# Patient Record
(demographics unavailable — no encounter records)

---

## 2024-12-03 NOTE — REVIEW OF SYSTEMS
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

## 2024-12-03 NOTE — PHYSICAL EXAM
[FreeTextEntry1] : videoPHYSICAL EXAMINATION: General appearance - well appearing and smiles Mental status - nonverbal Commands:not following commands Respiratory - no wheezing heard CHEST: equal expansion upon breathing in Abdomen - was not checked Skin - no rash Neurological -  Musculoskeletal - Range of Motion: Right UE: elbow can be extended to neutral Left UE: [elbow can be extended to neutral ] Right LE: popliteal angle 35 degrees and hip abduction angle 45 degree---->70 degreees Left LE: same as above.
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
## 2024-12-10 NOTE — CONSULT LETTER
[Dear  ___] : Dear  [unfilled], [Courtesy Letter:] : I had the pleasure of seeing your patient, [unfilled], in my office today. [Please see my note below.] : Please see my note below. [Sincerely,] : Sincerely, [FreeTextEntry3] : Eden Alberto CPNP Certified Pediatric Nurse Practitioner  Pediatric Neurology  [facility]

## 2024-12-10 NOTE — PHYSICAL EXAM
[Well-appearing] : well-appearing [Normocephalic] : normocephalic [No facial asymmetry or weakness] : no facial asymmetry or weakness [No abnormal involuntary movements] : no abnormal involuntary movements [de-identified] : asleep  [de-identified] : does not follow directions, does not respond to name, no purposeful words noted  [de-identified] : hypertonia, contractures  [de-identified] : non-ambulatory

## 2024-12-10 NOTE — ASSESSMENT
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 improvement in seizures since increasing VImpat. .Spasticity improved since Botox.

## 2024-12-10 NOTE — PLAN
[FreeTextEntry1] : - Change Phenobarbital to tablet per patient preference  - Continue Onfi 5ml BID  - Continue Baclofen 10mg BID - Continue Vimpat 6ml BID ( 6mg/kg/day) - Continue all services  - Recommend establishing care with Neurosurgery for  shunt  - Follow up 3 months - call sooner for concerns

## 2024-12-19 NOTE — ASSESSMENT
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 improvement in seizures since increasing VImpat but recent concerns for daytime sleepiness. .Spasticity improved since Botox.
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
## 2024-12-19 NOTE — PHYSICAL EXAM
[de-identified] : does not make eye contact  [de-identified] : does not follow directions, does not respond to name, no purposeful words noted  [de-identified] : hypertonia, contractures  [de-identified] : non-ambulatory  [de-identified] : + clonus

## 2024-12-19 NOTE — PLAN
[FreeTextEntry1] : - Continue Phenobarbitol 32.4 mg BID  - Change  Onfi 2.5ml/ 7.5ml to see if improves daytime sleepiness  - Continue Baclofen 10mg BID - Continue Vimpat 6ml BID ( 6mg/kg/day) - Continue all services  - Refer to GI for poor weight gain  - Follow up 3 months - call sooner for concerns

## 2024-12-19 NOTE — PHYSICAL EXAM
[de-identified] : does not make eye contact  [de-identified] : does not follow directions, does not respond to name, no purposeful words noted  [de-identified] : hypertonia, contractures  [de-identified] : non-ambulatory  [de-identified] : + clonus

## 2024-12-19 NOTE — PHYSICAL EXAM
[de-identified] : does not make eye contact  [de-identified] : does not follow directions, does not respond to name, no purposeful words noted  [de-identified] : hypertonia, contractures  [de-identified] : non-ambulatory  [de-identified] : + clonus

## 2024-12-19 NOTE — CONSULT LETTER
[FreeTextEntry3] : Eden Alberto CPNP Certified Pediatric Nurse Practitioner  Pediatric Neurology  [facility]

## 2024-12-19 NOTE — CONSULT LETTER
[FreeTextEntry3] : Eden Alberto CPNP Certified Pediatric Nurse Practitioner  Pediatric Neurology  [facility]

## 2024-12-19 NOTE — CONSULT LETTER
[FreeTextEntry3] : Eden Alberto CPNP Certified Pediatric Nurse Practitioner  Pediatric Neurology  [facility]

## 2025-01-09 NOTE — ASSESSMENT
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 Improvement in seizures since increasing VImpat but recent concerns for daytime sleepiness as well as decrease po and coughing with feeds.. .Spasticity improved since Botox.

## 2025-01-09 NOTE — PLAN
[FreeTextEntry1] : - Continue Phenobarbitol 32.4 mg BID  - Change  Onfi 2.5ml/ 7.5ml to see if improves daytime sleepiness  - Continue Baclofen 10mg BID - Continue Vimpat 6ml BID ( 6mg/kg/day) - Continue all services  - Refer to GI for poor weight gain  - Trough levels  - Follow up 3 months - call sooner for concerns

## 2025-01-09 NOTE — REASON FOR VISIT
[Follow-Up Evaluation] : a follow-up evaluation for [Seizure Disorder] : seizure disorder [Home] : at home, [unfilled] , at the time of the visit. [Medical Office: ([facility])___] : at the medical office located in  [Mother] : mother [FreeTextEntry2] : Mother

## 2025-01-09 NOTE — CONSULT LETTER
[Dear  ___] : Dear  [unfilled], [Courtesy Letter:] : I had the pleasure of seeing your patient, [unfilled], in my office today. [Please see my note below.] : Please see my note below. [Sincerely,] : Sincerely, [FreeTextEntry3] : Eden Alberto CPNP Certified Pediatric Nurse Practitioner  Pediatric Neurology  [facility]
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
## 2025-01-09 NOTE — PHYSICAL EXAM
[Well-appearing] : well-appearing [Normocephalic] : normocephalic [No abnormal involuntary movements] : no abnormal involuntary movements [de-identified] : does not make eye contact  [de-identified] : does not follow directions, does not respond to name, no purposeful words noted  [de-identified] : hypertonia, contractures  [de-identified] : non-ambulatory  [de-identified] : + clonus

## 2025-02-05 NOTE — ASSESSMENT
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 Patient was accompanied to the evaluation by their mother who provided the case history information, and served as a reliable informant. Parent reported coughing consistently with Thin Liquids and minimal coughing with purees.    PRIMARY LANGUAGE:  Reported Primary Language:English.    BIRTH & MEDICAL HISTORY: Birth and medical history was gathered via parent interview and review of electronic medical record. Patient was born full term via vaginal delivery. There were no complications during delivery, and he was discharged home with Mother. He hit all early milestones appropriately up until 4 months of age when patient suffered non-accidental trauma. Patient was discharged to rehab with diagnosis of Cerebral palsy and epilepsy s/p  shut. As per mother they recently moved from [place] where he was seeing a neurologist for his seizures. His most recent EEG was 1 yr. ago in [place] which mother reports was abnormal. His seizures are described as generalized, with a right-side eye gaze followed by bilateral hand stiffening and trembling. Reported pneumonia diagnosis with rhinovirus in July 2024.     Current Respiratory Status: Room air  Medications: Medication use was reviewed and a list of patient's current medications is available in their chart.  Medical allergies: No known Medical allergies reported   Food allergies: No known food allergies reported   Food intolerances: No known food intolerances reported   THERAPEUTIC HISTORY: Per report, patients receiving therapist through school based services with speech therapy (2x/week), feeding therapy (3x/weeK), occupational (2x/week), vision therapy (2x/week) and physical therapy (3x/week).    Results of Previous Modified Barium Swallow Study (MBSS)/Videofluoroscopic Swallow Studies (VFSS):  Parent reported when patient was two years of age, patient had a Modified Barium Swallow Study completed in [place]. Reported no penetration/aspiration viewed for all solids and Thin Liquids.    Results of Previous Clinical swallow evaluations:  None reported    FEEDING HISTORY:  Current Diet (based on the International Dysphagia Diet Standardization initiative [IDDSI]):  Oral diet of purees (IDDSI Level 4) and Thin Liquids (IDDSI Level 0). Parent reported decreased PO intake. Patient eats purees and soft & bite sized solids at home and purees at school as school speech language pathologist has concerns for aspiration. Parent reported consistent coughing with Thin Liquids. Minimal coughing with purees and some coughing with soft & bite sized solids. Denied any recent URIs. Reported pneumonia diagnosis with rhinovirus in July 2024.     Feeding Method: Some assistance required   Reported Endurance During Meals: Fair   Feeding schedule: Meals 3x/day with snacks   Feeding tube history: none reported   Mental status: alert and responsive  Head Control: Utilization of specialized equipment (personal wheelchair). Patient required head support from clinician during feeding task.   Trunk Control: Utilization of specialized equipment (personal wheelchair)   ORAL MOTOR ASSESSMENT:  A cursory oral mechanism examination was limited due to reduced participation and reduced ability to follow commands.  Patient presented with open mouth posture.   Dentition:  Within functional limits for age   Oral Mucosa: Moist  Labial: Impaired strength and impaired coordination   Lingual: Impaired strength and impaired coordination   Secretion management: some drooling observed   MODIFIED BARIUM SWALLOW STUDY (MBSS)/VIDEOFLOUROSCOPIC SWALLOW STUDY (VFSS)ASSESSMENT:  The patient was assessed seated upright in tumble form chair [facility] with [facility]iologist present. Patient's caregiver was present throughout. Patient was fed by clinician.    Respiratory Status during Evaluation:Room air  Secretion Management: WFL   There was, no coughing, no throat clearing, no wet/gurgly vocal quality prior to PO administration.  The patient was alert and cooperative.   VFSS/MBS Eval: Solid Trials:  Consistencies Administered:   Pudding thick consistency via one fourth of a teaspoon  Purees (IDDSI Level 4) via one fourth of a teaspoon    Modality: Dr. Devi's infant spoon  Feeding method: Fed by clinician  Positioning: seated upright in tumble form chair  Endurance during trials: fair  Patient required minimal encouragement/praise to complete testing/evaluation.  There was no coughing, no throat clearing, no wet/gurgly vocal quality prior to PO administration.   Oral Stages for Solids:   Patient's oral preparatory stage marked by weak and severe discoordination of lingual and labial movements, impacting age appropriate feeding skills with reduced labial seal, inadequate stripping of spoon. Patient's oral stage characterized by severe discoordination of oral skills and severely reduced oral control, resulting in moderate premature spillage to the pyriforms and valleculae. Decreased bolus formation, cohesion and manipulation noted with delayed anterior to posterior transport for purees and pudding thick consistency.    Pharyngeal Phase for Solids:   Pharyngeal stage was marked by   Moderately delayed initiation of the pharyngeal swallow  Moderately reduced hyolaryngeal elevation  Mildly reduced epiglottic retroflexion  Delayed pharyngeal transit time   Laryngeal penetration  - Inconsistent mild silent penetration viewed during the swallow with immediate and spontaneous retrieval viewed for Purees (IDDSI Level 4)  - Inconsistent moderate silent penetration viewed during the swallow with immediate and spontaneous retrieval viewed for pudding thick consistency    Aspiration:   -Mild silent aspiration viewed during the swallow with incomplete retrieval for half a teaspoon of pudding thick consistency. No response to aspirated material.   Integrity of cricopharyngeal opening: Yes   Residue:   Trace mild residue posterior pharyngeal wall   Esophageal Phase:   Backflow: observed. Please refer to physician's report with regard to details for esophageal stage of swallow.   VFSS/MBS Eval: Fluid Trials:  Consistencies Administered:   -Thin Liquids (IDDSI Level 0) via Dr. Devi's straw cup/open cup  -Mildly Thick Liquids (IDDSI Level 2) via Dr. Collins straw cup/open cup  -Moderately Thick (Level 3) via Dr. Devi's straw cup/open cup   Feeding method: Fed by clinician  Positioning: seated upright in tumble form chair  Endurance during trials: fair  Patient required minimal encouragement/praise to complete testing/evaluation.  There was, no coughing, no throat clearing, no wet/gurgly vocal quality prior to PO administration.   Special considerations: Mildly Thick Liquids obtained via Hormel Thick and Easy, Mildly thickened apple juice and Moderately Thick Liquids obtained via Hormel Thick and Easy, Moderately thickened apple juice.     Oral Preparatory Stage for Fluids:  Patient's oral preparatory phase was marked by adequate expression of liquid through the straw. Patient with immature open cup drinking skills, marked by reduced oral grading and reduced jaw stability, requiring assistance with controlled sips for liquids. Patient's oral stage characterized by severe discoordination of oral skills and severely reduced oral control, resulting in moderate premature spillage to the pyriforms and valleculae. Decreased bolus formation, cohesion and manipulation noted for all liquid consistencies and delayed anterior to posterior transport.    Pharyngeal Phase:  Pharyngeal stage was marked by   Moderately delayed initiation of the pharyngeal swallow  Moderately reduced hyolaryngeal elevation  Mildly reduced epiglottic retroflexion  Delayed pharyngeal transit time   Laryngeal penetration  -Consistent moderate silent penetration during the swallow with immediate and spontaneous retrieval viewed for Thin Liquids (IDDSI Level 0)  -Consistent moderate silent penetration during the swallow with immediate and spontaneous retrieval viewed for Mildly Thick Liquids (IDDSI Level 2)  -Consistent moderate silent penetration during the swallow with immediate and spontaneous retrieval viewed for Moderately Thick Liquids (IDDSI Level 3)   Aspiration:   -One instance of trace silent aspiration viewed during the swallow with incomplete retrieval for Thin Liquids. No response to aspirated material.   -One instance of trace silent aspiration viewed during the swallow with incomplete retrieval for Moderately Thick Liquids. No response to aspirated material.  Integrity of cricopharyngeal opening: Yes   Residue along the base of the tongue: not viewed    Esophageal Phase:   Backflow: not observed. Please refer to physician's report with regard to details for esophageal stage of swallow.    ROSENBECK'S ASPIRATION-PENETRATION SCALE  Aspiration - Penetration Scale    (Rosenbek et al Dysphagia 11:93-98 (April 1996), Aspiration-Penetration Scale)  1.    Material does not enter the airway   2.    Material enters the airway, remains above the vocal folds, and is ejected from the airway   3.    Material enters the airway, remains above the vocal folds, and is not ejected   4.    Material enters the airway, contacts the vocal folds, and is ejected from the airway   5.    Material enters the airway, contacts the vocal folds, and is not ejected from the airway   6.    Material enters the airway, passes below the vocal folds and is ejected into the larynx or out of the airway   7.    Material enters the airway, passes below the vocal folds, and is not ejected from the trachea despite effort   8.    Material enters the airway, passes below the vocal folds, and no effort is made to eject   TRIALS ADMINISTERED AND SEVERITY SCALE:   2=Purees (IDDSI Level 4) via one fourth of a teaspoon  8=Pudding thick consistency via one fourth of a teaspoon   8=Thin Liquids (IDDSI Level 0)  2=Mildly Thick Liquids (IDDSI Level 2)  8=Moderately Thick Liquids (IDDSI Level 3)   PROGNOSIS:  Prognosis is guarded with therapeutic intervention and, parent involvement, caregiver involvement, patient involvement.   EDUCATION:   Discussed results with mother of the patient. Speech language pathologist spoke to referring neurology NP, who recommended for patient to be escorted [place] [facility] [place] medical workup. Speech language pathologist escorted patient and family to [facility].    Contributing Factors to Swallowing Impairment:  -Reduced oral strength/coordination  -Impaired oral-pharyngeal stage  -Impaired oral-pharyngeal transport  -Delayed swallow initiation  -Reduced laryngeal excursion   Impact on safety and functioning:  -Risk for aspiration  -Risk for inadequate nutrition/hydration   IMPRESSIONS: Rolo Haas, a 5 year old male was referred by Peds Neurology NP, Eden Alberto for a Modified Barium Swallow Study to assess oral and pharyngeal swallow in the setting of spastic quadriplegic cerebral palsy type 1. Patient presents with severe oropharyngeal dysphagia. Oral deficits of purees, pudding thick consistency and thickened liquids marked by weak and severely disorganized oral movements impacting age-appropriate feeding skills with reduced labial seal, inadequate stripping of spoon. Patient with severely reduced oral control, resulting in moderate premature spillage to the pyriforms and valleculae. Poor bolus formation, cohesion and manipulation noted with delayed anterior posterior transport for purees, pudding thick consistency and thickened liquids. Pharyngeal stage was marked by moderately delayed initiation of the pharyngeal swallow, moderately reduced hyolaryngeal elevation, moderately reduced epiglottic retroflexion, delayed pharyngeal transit. Patient with consistent silent penetration and silent aspiration observed for liquid consistencies and pudding thick consistency during the swallow across all consistencies. As patient is at risk for aspiration, at this time, a safe oral diet cannot be recommended at this time. Recommend to discontinue oral feeding with placement of alternative/non-oral method of nutritional intake as per MD. Speech language pathologist spoke to referring neurology NP, who recommended for patient to be escorted to [facility] [place] further medical workup. Speech language pathologist escorted patient and family to [facility].      DIET/FLUID RECOMMENDED CONSISTENCIES: Discontinue oral feeding with placement of alternative/non-oral method of nutritional intake as per MD     ADDITIONAL RECOMMENDATIONS:  1. Follow up with gastroenterology to ensure patient meets adequate nutrition/hydration goals.   2. Strongly recommend consideration for a long-term feeding modality given the severity of oropharyngeal dysphagia.  3. Continue with feeding therapy (CPT 60452) through community based services.   4. Initiate oral pleasure tastes (via spoon coated dips) of purees under direct supervision of treating Speech Language Pathologist only. Transition to oral feedings with trained family/staff to be determined by treating Speech-Language Pathologist and Physician based on performance and per documented progress in therapy. Please note that pleasure tastes are for oral stimulation purposes and to facilitate pharyngeal swallow of oral secretions.  5. This patient will require a repeat Modified Barium Swallow Study for advancement/upgrade in diet consistency.   6. Aspiration precautions:  Monitor for signs and symptoms of aspiration and or laryngeal penetration, such as change of breathing pattern, cough, throat clearing, gurgly/wet voice, color change, fever, pneumonia, and upper respiratory infection.  7 .Continue to follow-up with all established providers.   This referral process was reviewed with the parent. No further recommendations were made at this time. Please feel free to contact the Center at [phone], if any additional information is needed.   Nehal Cabrera M.S., CCC-SLP, TSSLD, BE  [facility] #[id number]
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
## 2025-02-10 NOTE — ASSESSMENT
Mild anemia , stable. Can see hematology if patient wishes.  [FreeTextEntry1] : 6 yo male who received botox injection to hamstring and iliacus and peroneus alchohol neurolysis to obturator nerve ayden with good effiacy  better sitting balance  the mother needs the letter for school that Rolo can particpate in stander Therapy  reocmmend getting hip xray for monitoring purposes  follow up in person in 2 to 3 months
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
## 2025-02-13 NOTE — REVIEW OF SYSTEMS
[Joint Stiffness] : joint stiffness [Negative] : Genitourinary [Fever] : no fever [Eye Pain] : no eye pain [Earache] : no earache [Chest Pain] : no chest pain [Shortness Of Breath] : no shortness of breath [Abdominal Pain] : no abdominal pain [Skin Rash] : no skin rash [Headache] : no headaches [Suicidal] : not suicidal
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
## 2025-02-13 NOTE — REASON FOR VISIT
[Follow-Up Evaluation] : a follow-up evaluation for [Seizure Disorder] : seizure disorder [Home] : at home, [unfilled] , at the time of the visit. [Medical Office: ([facility])___] : at the medical office located in  [Mother] : mother [FreeTextEntry2] : Mother

## 2025-02-13 NOTE — CONSULT LETTER
[Dear  ___] : Dear  [unfilled], [Courtesy Letter:] : I had the pleasure of seeing your patient, [unfilled], in my office today. [Please see my note below.] : Please see my note below. [Sincerely,] : Sincerely, [FreeTextEntry3] : Eden Alberto CPNP Certified Pediatric Nurse Practitioner  Pediatric Neurology  [facility]

## 2025-02-13 NOTE — PLAN
[FreeTextEntry1] : - Switch Phenobarbital to 20mg/5ml- 7.5ml BID  - Continue  Onfi 2.5ml/ 7.5ml - Continue Baclofen 10mg BID - Continue Vimpat 6ml BID ( 6mg/kg/day) - Continue all services  - Follow up 3 months - call sooner for concerns

## 2025-02-13 NOTE — ASSESSMENT
[FreeTextEntry1] : 4 yo male with spastic quad CP with still efficacious with alcohol denervation to obturator nerve  doing well and missed some baclofen dosage that might contribute to painful response vs underlying hip pathology with signifcant migration index  please obtain xray while getting gtube   follow up in two months for RPA   will send Rx for liquid baclofen for better absorption

## 2025-02-13 NOTE — ASSESSMENT
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 Improvement in seizures since increasing VImpat, Recently failed swallow study resulting in NGT placement while pending GT. Increased seizures during admission for NGT likely due to Co-vid +. . .Spasticity improved since Botox.

## 2025-02-13 NOTE — PHYSICAL EXAM
[Well-appearing] : well-appearing [Normocephalic] : normocephalic [No abnormal involuntary movements] : no abnormal involuntary movements [de-identified] : does not make eye contact  [de-identified] : does not follow directions, does not respond to name, no purposeful words noted  [de-identified] : hypertonia, contractures  [de-identified] : non-ambulatory  [de-identified] : + clonus

## 2025-02-13 NOTE — PHYSICAL EXAM
[FreeTextEntry1] : video PHYSICAL EXAMINATION: General appearance - well appearing and smiles Mental status - nonverbal Commands:not following commands Respiratory - no wheezing heard CHEST: equal expansion upon breathing in Abdomen - was not checked Skin - no rash Neurological -  Musculoskeletal - Range of Motion: Right UE: elbow can be extended to neutral Left UE: [elbow can be extended to neutral ] Right LE: popliteal angle 35 degrees and hip abduction angle 60 degree Left LE: same as above.   questionable painful response with hip flexion

## 2025-02-28 NOTE — ADDENDUM
[FreeTextEntry1] : Documented by Melinda Lackey acting as a scribe for Dr. Stockton on 02/28/2025. All medical record entries made by the Scribe were at Dr. Stockton's direction and personally dictated by me on 02/28/2025. I have reviewed the chart and agree that the record accurately reflects my personal performances of the history, physical exam, assessment, and plan. I have also personally directed, reviewed, and agree with the plan.

## 2025-02-28 NOTE — CONSULT LETTER
[Dear  ___] : Dear  [unfilled], [Consult Letter:] : I had the pleasure of evaluating your patient, [unfilled]. [Please see my note below.] : Please see my note below. [Consult Closing:] : Thank you very much for allowing me to participate in the care of this patient.  If you have any questions, please do not hesitate to contact me. [Sincerely,] : Sincerely, [FreeTextEntry2] : Ale Mccartney MD [FreeTextEntry3] :  Marilyn Stockton MD Division of Pediatric, General, Thoracic, and Endoscopic Surgery  [facility]

## 2025-02-28 NOTE — PHYSICAL EXAM
[Acute Distress] : no acute distress [Alert] : alert [Toxic appearing] : well appearing [Normal Respiratory Efforts] : normal respiratory efforts [Tender] : not tender [Distended] : not distended [TextBox_13] : Stigmata of cerebral palsy, nasogastric tube in place [TextBox_37] : No prior surgical scars

## 2025-02-28 NOTE — REASON FOR VISIT
[Follow-up - Scheduled] : a follow-up, scheduled visit for [G-tube care] : G-tube care [Mother] : mother [FreeTextEntry4] : Ale Mccartney MD

## 2025-02-28 NOTE — ASSESSMENT
[FreeTextEntry1] :  Rolo is a 7-year-old boy with a history of traumatic brain injury, cerebral palsy, seizures, and developmental delay.  He was recently seen in the hospital for failure to thrive and nasogastric tube feedings were initiated at that time.  He returns to the office today for evaluation of a surgical gastrostomy tube insertion.  He has been tolerating bolus feeds without issue.  Will therefore plan for laparoscopic gastrostomy tube insertion.  The nature of surgery and the anticipated postoperative course were reviewed with mother.  This discussion included risks (pain, bleeding, infection, damage to surrounding structure, G-tube dislodgment, need for additional surgery, gastrostomy tube leakage, granulation tissue formation around the gastrostomy tube site), benefits (establishment of dependable enteral access), and alternatives (continued nasogastric tube feedings).  All questions were answered and appropriate understanding was demonstrated.  Will plan for surgical scheduling at the family's earliest convenience.  Presurgical testing clearance will be needed.

## 2025-02-28 NOTE — HISTORY OF PRESENT ILLNESS
[FreeTextEntry1] : Rolo is a 5-year-old boy with a history of traumatic brain injury, cerebral palsy, seizures, and developmental delay.  He was seen in the hospital approximately 2 to 3 weeks ago when he developed difficulty with oral intake.  At that time nasogastric tube feedings were initiated.  He was seen by the surgery team in the hospital at that time.  However, he also had an active viral respiratory illness and so was not optimized for surgical insertion of gastrostomy tube.  His viral illness has since resolved and he has done well since discharge from the hospital.  He has been receiving nasogastric tube feedings.  He is currently getting 330 ml every 6 hours.  Mother denies any issues with reflux or emesis.  She states he has been more alert and active since initiation of the tube feedings.  Past medical history: As above Past surgical history: Initial cerebral drain placement at the time of his injury, however he has never had a ventriculoperitoneal shunt.  He has undergone anesthesia multiple times for various studies and Botox injections.  There is no history of complications related to anesthesia. Family history: There is no family history of anesthetic related complications or bleeding disorders ROS: Aside from the active neurologic issues mother denies any associated medical problems

## 2025-03-12 NOTE — ASSESSMENT
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

## 2025-03-12 NOTE — PHYSICAL EXAM
[FreeTextEntry1] : PHYSICAL EXAMINATION: General appearance - well appearing and smiles Mental status - nonverbal Commands:not following commands Respiratory - no wheezing heard CHEST: equal expansion upon breathing in Abdomen - was not checked Skin - no rash Neurological -  Musculoskeletal - Range of Motion: Right UE: elbow can be extended to neutral Left UE: [elbow can be extended to neutral ] Right LE: popliteal angle 35 degrees and hip abduction angle 45 degree Left LE: same as above.
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
## 2025-03-12 NOTE — REVIEW OF SYSTEMS
[Difficulty Walking] : difficulty walking [Negative] : Heme/Lymph [Fever] : no fever [Eye Pain] : no eye pain [Earache] : no earache [Chest Pain] : no chest pain [Shortness Of Breath] : no shortness of breath [Dysuria] : no dysuria [Joint Pain] : no joint pain [FreeTextEntry7] : just got gtube

## 2025-03-26 NOTE — ASSESSMENT
[FreeTextEntry1] : CONOR is a five-year-old boy with HIE, CP and dysphagia who is now s/p laparoscopic gastrostomy tube placement with Dr. Stockton and presents today for a routine post op visit. Since discharge to home, he has had intermittent vomiting and constipation.  He is also  to touch around the GT site.  On exam the button is a little snug but there is no evidence of surrounding skin erythema or infection. I reviewed with Mom the importance of removing the extension between feeds and keeping the tube secure to prevent dislodgement.  She knows to call the office or go directly to the ED in the event the tube should come out.  Plan:  - Increase miralax to 1 cap daily  - If he does not have a BM by the evening, administer a pedialax enema - Call GI office to try to get an earlier appointment - continue with feeds at a slower rate to prevent emesis - run feeds over 2 hours - Follow up with pediatric surgery in 4 weeks for GT exchange and 6-week post op teaching.   All questions answered, follow up arranged.

## 2025-03-26 NOTE — PHYSICAL EXAM
[Clean] : clean [Dry] : dry [Intact] : intact [Erythema] : no erythema [NL] : grossly intact [FreeTextEntry1] : 14Fx1.2 cm amt mini one balloon button in place, snug fit, small amount of serous drainage.  No granulation.

## 2025-03-26 NOTE — REASON FOR VISIT
[Laparoscopic G- tube placement] : laparoscopic G- tube placement [Pain] : ~He/She~ has pain [Vomiting] : ~He/She~ has vomiting [Drainage at incision] : ~He/She~ has drainage at incision [Fever] : ~He/She~ does not have fever [Normal bowel movements] : ~He/She~ does not have normal bowel movements [Redness at incision] : ~He/She~ does not have redness at incision [Swelling at surgical site] : ~He/She~ does not have swelling at surgical site [de-identified] : 3/11/25 [de-identified] : Dr. Stockton  [de-identified] : Rolo is a five-year-old boy with HIE, CP and dysphagia who is now s/p laparoscopic gastrostomy tube placement with Dr. Stockton and presents today for a routine post op visit. Rolo was started on an NG feeding regimen in the hospital prior to his surgery consisting of 330ml formula every 6 hours. Mom states that since discharge home after surgery he has been vomiting a quarter of his feeds 1-2 times daily. He has also not had a bowel movement on his own since prior to surgery.  He suffered from constipation prior and Mom gives him 1/2 cap of miralax daily.  Despite this he remains constipated.  Mom has been giving him a fleet enema daily to induce a BM.  Mom also reports that he has been congested in the past few days and noticed distention with his feed yesterday.  She has slowed his feeds down over 1.5 to 2 hours and this has helped prevent vomiting. Mom has not yet followed up with peds GI, the next available appt is not until the end of April.   Mom has received supplies including a spare button.
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
## 2025-04-02 NOTE — DATA REVIEWED
[de-identified] : X-Rays pelvis were obtained from [facility] on 03/11/2025 and independently reviewed today bilateral hip dysplasia with subluxation.
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
## 2025-04-02 NOTE — PHYSICAL EXAM
[FreeTextEntry1] : General appearance - well appearing and smiles Mental status - nonverbal Commands:not following commands Respiratory - no wheezing heard CHEST: equal expansion upon breathing in Abdomen - was not checked Skin - no rash Neurological -  Bilateral hips Abduction 20 degrees on left and 30 degrees on right

## 2025-04-02 NOTE — CONSULT LETTER
[Dear  ___] : Dear  [unfilled], [Courtesy Letter:] : I had the pleasure of seeing your patient, [unfilled], in my office today. [Please see my note below.] : Please see my note below. [Sincerely,] : Sincerely, [FreeTextEntry3] : Eden Alberto CPNP Certified Pediatric Nurse Practitioner  Pediatric Neurology  [facility]

## 2025-04-02 NOTE — PHYSICAL EXAM
[Well-appearing] : well-appearing [Normocephalic] : normocephalic [No abnormal involuntary movements] : no abnormal involuntary movements [de-identified] : does not make eye contact  [de-identified] : does not follow directions, does not respond to name, no purposeful words noted  [de-identified] : hypertonia, contractures  [de-identified] : non-ambulatory  [de-identified] : + clonus
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
## 2025-04-02 NOTE — REASON FOR VISIT
[Follow-Up Evaluation] : a follow-up evaluation for [Seizure Disorder] : seizure disorder [Home] : at home, [unfilled] , at the time of the visit. [Medical Office: ([facility])___] : at the medical office located in  [Mother] : mother [FreeTextEntry2] : Mother

## 2025-04-02 NOTE — DATA REVIEWED
[de-identified] : X-Rays pelvis were obtained from [facility] on 03/11/2025 and independently reviewed today bilateral hip dysplasia with subluxation.
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
## 2025-04-29 NOTE — CONSULT LETTER
[Dear  ___] : Dear  [unfilled], [Consult Letter:] : I had the pleasure of evaluating your patient, [unfilled]. [Please see my note below.] : Please see my note below. [Consult Closing:] : Thank you very much for allowing me to participate in the care of this patient.  If you have any questions, please do not hesitate to contact me. [Sincerely,] : Sincerely, [FreeTextEntry3] : Kelli Morales MD Division of Pediatric Gastroenterology [facility]

## 2025-04-29 NOTE — HISTORY OF PRESENT ILLNESS
[de-identified] : 6-year-old male with a history of traumatic brain injury, cerebral palsy, seizures, developmental disability with feeding difficulty s/p gastrostomy tube placement currently exclusively tube fed presents for establishment of care.   He is s/p GT placement by Dr. Stockton on March 11, 2025 due to feeding difficulty and failure to thrive. Pediasure every 6 hours 330 ml over 90 minutes with 45 ml water flush before and after No vomiting. Good UO.  BMs 3-4x/week on MiraLax 1/2 cap daily.

## 2025-04-29 NOTE — HISTORY OF PRESENT ILLNESS
[de-identified] : 6-year-old male with a history of traumatic brain injury, cerebral palsy, seizures, developmental disability with feeding difficulty s/p gastrostomy tube placement currently exclusively tube fed presents for establishment of care.   He is s/p GT placement by Dr. Stockton on March 11, 2025 due to feeding difficulty and failure to thrive. Pediasure every 6 hours 330 ml over 90 minutes with 45 ml water flush before and after No vomiting. Good UO.  BMs 3-4x/week on MiraLax 1/2 cap daily.

## 2025-04-29 NOTE — CONSULT LETTER
[Dear  ___] : Dear  [unfilled], [Consult Letter:] : I had the pleasure of evaluating your patient, [unfilled]. [Please see my note below.] : Please see my note below. [Consult Closing:] : Thank you very much for allowing me to participate in the care of this patient.  If you have any questions, please do not hesitate to contact me. [Sincerely,] : Sincerely, [FreeTextEntry3] : Kelli Morales MD Division of Pediatric Gastroenterology [facility]

## 2025-04-29 NOTE — PHYSICAL EXAM
[Well Developed] : well developed [NAD] : in no acute distress [PERRL] : pupils were equal, round, reactive to light  [Moist & Pink Mucous Membranes] : moist and pink mucous membranes [CTAB] : lungs clear to auscultation bilaterally [Regular Rate and Rhythm] : regular rate and rhythm [Normal S1, S2] : normal S1 and S2 [Soft] : soft  [Normal Bowel Sounds] : normal bowel sounds [Feeding Tube] : There was a feeding tube  [___F] : [unfilled] F [___cm] : [unfilled] cm [Clean] : clean [Dry] : dry [Tube Rotates Easily] : tube rotates easily [No HSM] : no hepatosplenomegaly appreciated [Normal Tone] : normal tone [Well-Perfused] : well-perfused [Interactive] : interactive [icteric] : anicteric [Respiratory Distress] : no respiratory distress  [Distended] : non distended [Tender] : non tender [Erythema] : no erythema [Granulation Tissue] : no granulation tissue [Edema] : no edema [Cyanosis] : no cyanosis [Rash] : no rash [Jaundice] : no jaundice [FreeTextEntry2] : mini one

## 2025-04-29 NOTE — ASSESSMENT
[FreeTextEntry1] : 6-year-old male with a history of traumatic brain injury, cerebral palsy, seizures, developmental disability with feeding difficulty s/p gastrostomy tube placement currently exclusively tube fed with excellent weight gain. GT replaced GT care reviewed Plan to continue exclusive enteral feeding regimen of Pediasure Decrease feedings to 300 ml and change to 4 times a day from every 6 hours cont water flush prior and after feeding monitor feeding tolerance, stooling, hydration status, weight gain and growth cont to regulate bowel pattern wtih MiraLax, titrating dose follow up appt in conjunction with dietitian in 2 months monitor for refeeding

## 2025-05-14 NOTE — REVIEW OF SYSTEMS
[Fever] : no fever [Eye Pain] : no eye pain [Earache] : no earache [Chest Pain] : no chest pain [Shortness Of Breath] : no shortness of breath [Joint Stiffness] : joint stiffness [Skin Rash] : no skin rash [Headache] : no headaches [Depression] : depression [FreeTextEntry7] : gtube [FreeTextEntry8] : david
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
## 2025-05-14 NOTE — PHYSICAL EXAM
[FreeTextEntry1] : Video PHYSICAL EXAMINATION: General appearance - well appearing and smiles Mental status - nonverbal Commands:not following commands Respiratory - no wheezing heard CHEST: equal expansion upon breathing in Abdomen - was not checked Skin - no rash Neurological -  Musculoskeletal - Range of Motion: Right UE: elbow can be extended to neutral Left UE: [elbow can be extended to neutral ] hip abduction appears to 55 degree ayden  pt has occoasinal hand clencig and opens up

## 2025-07-01 NOTE — CONSULT LETTER
[Dear  ___] : Dear  [unfilled], [Consult Letter:] : I had the pleasure of evaluating your patient, [unfilled]. [Please see my note below.] : Please see my note below. [Consult Closing:] : Thank you very much for allowing me to participate in the care of this patient.  If you have any questions, please do not hesitate to contact me. [Sincerely,] : Sincerely, [FreeTextEntry3] : Kelli Morales MD Division of Pediatric Gastroenterology [facility]

## 2025-07-01 NOTE — CONSULT LETTER
[Dear  ___] : Dear  [unfilled], [Consult Letter:] : I had the pleasure of evaluating your patient, [unfilled]. [Please see my note below.] : Please see my note below. [Consult Closing:] : Thank you very much for allowing me to participate in the care of this patient.  If you have any questions, please do not hesitate to contact me. [Sincerely,] : Sincerely, [FreeTextEntry3] : Kelli Morales MD Division of Pediatric Gastroenterology [facility]

## 2025-07-01 NOTE — HISTORY OF PRESENT ILLNESS
[FreeTextEntry1] : Nutritionist Intake: 6 year old male with a history of traumatic brain injury, cerebral palsy, seizures, developmental disability with feeding difficulty s/p gastrostomy tube placement, presents for nutrition evaluation and management of GT feeds.  Initially seen by Dr. Morales in April 2025. He is s/p GT placement by Dr. Stockton on March 11, 2025 due to feeding difficulty and failure to thrive.  At last visit, recommended decreasing calories in view of excellent wt gain. He returns today with wt loss of 0.6 kg over the past 2 months.  Previous GT regimen: 330ml Pediasure 4x/d every 6 hours over 90 minutes with 45 ml water flush before and after. Provided 1320 kcal/d.  Current GT regimen: 300ml Pediasure 1.0 @ 200 ml/hr over 90 min x 4 feeds/day. 45 ml water flush before and after each feed Regimen provides 1560ml, 1200 kcal, 49 kcal/kg/d. Pt is tolerating GT feeds. No vomiting.  10ml water flush before and after meds  At times, mom has only been able to give 3 feeds/d as pt was constipated and uncomfortable. BMs now 5-6x/week. Mom adds MiraLax 1/2 cap to feeding bag as needed. Mom is also giving a shot of ginger via GT to help with BMs. Good UO. Mom reports wt was 35 lbs prior to GT, now 55 lbs.  DME: [facility]

## 2025-07-01 NOTE — HISTORY OF PRESENT ILLNESS
[de-identified] : 6-year-old male with a history of traumatic brain injury, cerebral palsy, seizures, developmental disability with feeding difficulty s/p gastrostomy tube placement currently exclusively tube fed with constipation presents for follow up. Continues with constipation. When constipated MiraLax being added to bag of feeds that receives throughout the day. Other times not receiving MiraLax. Current feeding regimen: Pediasure 300 ml over 90 min at rate of 200 with 45 ml water pre and post feeding and with medications No vomiting. Good UO.  Surgery: s/p GT placement by Dr. Stockton on March 11, 2025 due to feeding difficulty and failure to thrive. Good UO

## 2025-07-01 NOTE — PHYSICAL EXAM
[Well Developed] : well developed [NAD] : in no acute distress [PERRL] : pupils were equal, round, reactive to light  [icteric] : anicteric [Moist & Pink Mucous Membranes] : moist and pink mucous membranes [CTAB] : lungs clear to auscultation bilaterally [Respiratory Distress] : no respiratory distress  [Regular Rate and Rhythm] : regular rate and rhythm [Normal S1, S2] : normal S1 and S2 [Soft] : soft  [Distended] : non distended [Tender] : non tender [Normal Bowel Sounds] : normal bowel sounds [Feeding Tube] : There was a feeding tube  [___F] : [unfilled] F [___cm] : [unfilled] cm [Clean] : clean [Dry] : dry [Erythema] : no erythema [Granulation Tissue] : no granulation tissue [Tube Rotates Easily] : tube rotates easily [No HSM] : no hepatosplenomegaly appreciated [Normal Tone] : normal tone [Well-Perfused] : well-perfused [Edema] : no edema [Cyanosis] : no cyanosis [Rash] : no rash [Jaundice] : no jaundice [Interactive] : interactive [FreeTextEntry2] : mini one

## 2025-07-01 NOTE — ASSESSMENT
[FreeTextEntry1] : 6-year-old male with a history of traumatic brain injury, cerebral palsy, seizures, developmental disability with feeding difficulty s/p gastrostomy tube placement currently exclusively tube fed with excellent weight gain. Continues with tendency toward constipation.  Reviewed administration of MiraLax 1/2 cap in 4 ounces of water via GT daily and not in formula Feeding regimen as per nutrition note change to Pediasure with fiber 300ml q6h x 4 feeds/day. Provides 1200ml, 1200 kcal, 49 kcal/kg -Discussed gradually increasing rate of feeds as tolerated to goal of 300 ml/hr in order to provide feeding over 1 hour -Continue 45 ml water flush before and after each feed (provides additional 360ml) to meet fluid needs f/u with dietitian in 3 months f/u with GI and dietitian in 6 months reviewed GT care and need to f/u with pediatric surgery

## 2025-07-01 NOTE — HISTORY OF PRESENT ILLNESS
[de-identified] : 6-year-old male with a history of traumatic brain injury, cerebral palsy, seizures, developmental disability with feeding difficulty s/p gastrostomy tube placement currently exclusively tube fed with constipation presents for follow up. Continues with constipation. When constipated MiraLax being added to bag of feeds that receives throughout the day. Other times not receiving MiraLax. Current feeding regimen: Pediasure 300 ml over 90 min at rate of 200 with 45 ml water pre and post feeding and with medications No vomiting. Good UO.  Surgery: s/p GT placement by Dr. Stockton on March 11, 2025 due to feeding difficulty and failure to thrive. Good UO

## 2025-07-16 NOTE — REVIEW OF SYSTEMS
[Joint Stiffness] : joint stiffness [Difficulty Walking] : difficulty walking [Negative] : Heme/Lymph [Fever] : no fever [Eye Pain] : no eye pain [Earache] : no earache [Chest Pain] : no chest pain [Shortness Of Breath] : no shortness of breath [Skin Rash] : no skin rash [FreeTextEntry7] : gtube
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
## 2025-07-16 NOTE — PHYSICAL EXAM
[FreeTextEntry1] : PHYSICAL EXAMINATION: General appearance - well appearing and smiles Mental status - nonverbal Commands:not following commands Respiratory - no wheezing heard CHEST: equal expansion upon breathing in Abdomen - was not checked Skin - no rash Neurological - MAS 2 in pec shoulder internally rotated MAS 2 in hamstring MAS 2 in hip adductor Musculoskeletal - Range of Motion: Right UE: elbow can be extended to neutral Left UE: [elbow can be extended to neutral ] Right LE: popliteal angle 45 degrees and hip abduction angle 45 degree Left LE: same as above.

## 2025-07-22 NOTE — REVIEW OF SYSTEMS
[Hypertonicity] : hypertonic [Hypotonicity] : hypotonic [Restriction of Motion] : restriction of motion [Negative] : Genitourinary

## 2025-07-22 NOTE — PHYSICAL EXAM
[Clean] : clean [Dry] : dry [Intact] : intact [Granulation tissue] : granulation tissue [NL] : soft, not tender, not distended [Warm, well perfused x4] : warm, well perfused x4 [Erythema] : no erythema [Drainage] : no drainage [Normocephalic] : not normocephalic [FROM] : no full range of motion [Moves all extremities x4] : does not move all extremities x4 [Grossly intact] : not grossly intact [TextBox_37] : 14 fr x 1.5 AMT

## 2025-07-22 NOTE — HISTORY OF PRESENT ILLNESS
[FreeTextEntry1] : 6-year-old male with a history of traumatic brain injury, cerebral palsy, seizures, developmental disability with feeding difficulty s/p gastrostomy tube placement 3-11-25, Dr Stockton, currently exclusively tube fed.  Never had her 6 week post op teaching.  DMe is  [facility].  HAs 14 fr x 1.5 aMT which is a bit tight w peristomal granulation tissue.  Mom has been educated how to do the tube change. HE presents for attention to the g tube.  She is comfortable using the tube and belongs to different support groups.

## 2025-07-22 NOTE — ASSESSMENT
[FreeTextEntry1] : 6-year-old male with a history of traumatic brain injury, cerebral palsy, seizures, developmental disability with feeding difficulty s/p gastrostomy tube placement 3-11-25, Dr Stockton, HE presents for attention to the g tube After reviewing 6 week post op instructions with the family and giving them a copy of the instructions. I removed the gastrostomy button from the tract.  I replaced the stoma with the next size tube 14 fr x 1.7 aMT.  We filled the balloon with 4 mls of water.  When the extension set was applied, we aspirated gastric secretions.  Counseled the family about changing the water in the balloon every 2 weeks.  If the button dislodges, they can replace it with the same tube if the balloon is not leaking or a new tube.  They cannot wait to replace it or the stoma will shrink.  If they cannot get the tube back in they can place a 10 fr measuring device but they cannot feed through this so [place] have [place] [facility] [place]. I gave them a 10 fr replacement device with written instructions how and when to use it.  Scripts updated, email sent to [facility] that tube size was updated  plan change the button every 4 months change the water in the balloon every 2 weeks, on the 1 st and 15th of the month request a gastrostomy kit from supplier every 3 months follow up PRN

## 2025-07-22 NOTE — CONSULT LETTER
[Dear  ___] : Dear  [unfilled], [Courtesy Letter:] : I had the pleasure of seeing your patient, [unfilled], in my office today. [Please see my note below.] : Please see my note below. [Consult Closing:] : Thank you very much for allowing me to participate in the care of this patient.  If you have any questions, please do not hesitate to contact me. [Sincerely,] : Sincerely, [FreeTextEntry2] : Dr Aguilar [FreeTextEntry3] : Edelmira Luo  MSN  CPNP Pediatric Nurse Practitioner Department of Pediatric Surgery [facility] phone [phone] fax [phone]